# Patient Record
Sex: FEMALE | URBAN - METROPOLITAN AREA
[De-identification: names, ages, dates, MRNs, and addresses within clinical notes are randomized per-mention and may not be internally consistent; named-entity substitution may affect disease eponyms.]

---

## 2024-07-19 ENCOUNTER — OFFICE VISIT (OUTPATIENT)
Dept: URBAN - METROPOLITAN AREA CLINIC 96 | Facility: CLINIC | Age: 34
End: 2024-07-19
Payer: COMMERCIAL

## 2024-07-19 VITALS
BODY MASS INDEX: 33.13 KG/M2 | HEART RATE: 99 BPM | TEMPERATURE: 98.4 F | RESPIRATION RATE: 18 BRPM | DIASTOLIC BLOOD PRESSURE: 82 MMHG | WEIGHT: 180 LBS | HEIGHT: 62 IN | SYSTOLIC BLOOD PRESSURE: 119 MMHG

## 2024-07-19 DIAGNOSIS — K44.9 HIATAL HERNIA: ICD-10-CM

## 2024-07-19 PROCEDURE — 99204 OFFICE O/P NEW MOD 45 MIN: CPT

## 2024-07-19 RX ORDER — PANTOPRAZOLE SODIUM 20 MG/1
1 TABLET TABLET, DELAYED RELEASE ORAL ONCE A DAY
Qty: 90 TABLET | Refills: 1 | OUTPATIENT
Start: 2024-07-19

## 2024-07-19 NOTE — HPI-TODAY'S VISIT:
33-year-old female was admitted to [facility] 7/11 - 7/14/2020 for pyelonephritis and sepsis.  During her stay she had elevated liver enzymes, and noncontrast CT with hepatic steatosis and splenomegaly.  She was advised to follow-up with GI after discharge. . Pt reports she has upcoming visit with hematolgy for thrombocytopenia noted during inpatient, as well as nephrology for pyelonephritis. . Labs, 7/11/2024: CBC with WBC 14.0 (H), hemoglobin 15.8, platelets 122 K (L).   Labs, 7/12/2024: CBC with WBC 8.5, hemoglobin 12.4, platelets 90 8K (L).  CMP T. bili 0.9, alk phos 78, AST 66 (H), ALT 63 (H) Labs 7/13/2024: CBC with WBC 4.8, hemoglobin 11.6, platelets 100 K (L).  CMP T. bili 0.4, alk phos 62, AST 19, ALT 38.  Lipase 28.  Acute hepatitis panel unremarkable.  CMV - IgG positive, IgM negative.  EBV IgG positive, IgM negative. . CT abdomen pelvis without contrast, 7/11/2024: Fatty liver with splenomegaly. Ventral abdominal wall hernia containing fat. Moderate size hiatal hernia. Right-sided perinephritic stranding with findings compatible with medullary nephro calcinosis and multiple nonobstructive bilateral renal calculi.

## 2024-07-19 NOTE — PHYSICAL EXAM HENT:
Head, normocephalic, atraumatic, Nose/Nasopharynx, External nose normal appearance, nares patent, no nasal discharge, Mouth and Throat, Oral cavity appearance normal, Lips, Appearance normal Bladder non-tender and non-distended. Urine clear yellow.

## 2024-07-22 ENCOUNTER — DASHBOARD ENCOUNTERS (OUTPATIENT)
Age: 34
End: 2024-07-22

## 2024-07-22 PROBLEM — 863927004: Status: ACTIVE | Noted: 2024-07-22

## 2024-07-22 PROBLEM — 16294009: Status: ACTIVE | Noted: 2024-07-22

## 2024-07-22 PROBLEM — 197321007: Status: ACTIVE | Noted: 2024-07-22

## 2024-07-22 PROBLEM — 84089009: Status: ACTIVE | Noted: 2024-07-22

## 2024-07-22 PROBLEM — 302215000: Status: ACTIVE | Noted: 2024-07-22
